# Patient Record
Sex: MALE | Race: WHITE | NOT HISPANIC OR LATINO | Employment: OTHER | URBAN - METROPOLITAN AREA
[De-identification: names, ages, dates, MRNs, and addresses within clinical notes are randomized per-mention and may not be internally consistent; named-entity substitution may affect disease eponyms.]

---

## 2021-07-13 ENCOUNTER — HOSPITAL ENCOUNTER (EMERGENCY)
Facility: CLINIC | Age: 56
Discharge: HOME | End: 2021-07-13
Attending: EMERGENCY MEDICINE | Admitting: EMERGENCY MEDICINE
Payer: COMMERCIAL

## 2021-07-13 ENCOUNTER — HOSPITAL ENCOUNTER (EMERGENCY)
Dept: CT IMAGING | Facility: CLINIC | Age: 56
End: 2021-07-13
Attending: EMERGENCY MEDICINE
Payer: COMMERCIAL

## 2021-07-13 VITALS
TEMPERATURE: 98 F | OXYGEN SATURATION: 95 % | HEART RATE: 66 BPM | SYSTOLIC BLOOD PRESSURE: 124 MMHG | RESPIRATION RATE: 16 BRPM | DIASTOLIC BLOOD PRESSURE: 86 MMHG | WEIGHT: 189 LBS

## 2021-07-13 DIAGNOSIS — S09.90XA INJURY OF HEAD, INITIAL ENCOUNTER: ICD-10-CM

## 2021-07-13 DIAGNOSIS — S06.0X0A CONCUSSION WITHOUT LOSS OF CONSCIOUSNESS, INITIAL ENCOUNTER: ICD-10-CM

## 2021-07-13 DIAGNOSIS — S16.1XXA STRAIN OF NECK MUSCLE, INITIAL ENCOUNTER: ICD-10-CM

## 2021-07-13 PROCEDURE — 99284 EMERGENCY DEPT VISIT MOD MDM: CPT | Mod: 25

## 2021-07-13 PROCEDURE — 250N000013 HC RX MED GY IP 250 OP 250 PS 637: Performed by: EMERGENCY MEDICINE

## 2021-07-13 PROCEDURE — 70450 CT HEAD/BRAIN W/O DYE: CPT

## 2021-07-13 RX ORDER — IBUPROFEN 600 MG/1
600 TABLET, FILM COATED ORAL ONCE
Status: COMPLETED | OUTPATIENT
Start: 2021-07-13 | End: 2021-07-13

## 2021-07-13 RX ADMIN — IBUPROFEN 600 MG: 600 TABLET, FILM COATED ORAL at 15:51

## 2021-07-13 NOTE — DISCHARGE INSTRUCTIONS
The head CT scan appears reassuring.  Your symptoms appear consistent with a mild concussion and neck strain.  Due to use over-the-counter ibuprofen every 6-8 hours as needed for any further pain.  You can also apply ice to the affected areas every 3-4 hours for the next few days as well.  It is recommended to avoid any strenuous physical or mental activity over the next few days until your concussion symptoms resolve.  Follow-up with your primary care physician for reevaluation as needed or return back to ED sooner for any worsening headaches, dizziness, vomiting, confusion, or any other new or concerning symptoms.

## 2021-07-13 NOTE — ED PROVIDER NOTES
EMERGENCY DEPARTMENT ENCOUNTER      NAME: Jaskaran Nance  AGE: 55 year old male  YOB: 1965  MRN: [id number]  EVALUATION DATE & TIME: 2021  2:45 PM    PCP: No primary care provider on file.    ED PROVIDER: Shruthi Sky D.O.      CHIEF COMPLAINT:  Chief Complaint   Patient presents with     Fall         FINAL IMPRESSION:  1. Injury of head, initial encounter    2. Concussion without loss of consciousness, initial encounter    3. Strain of neck muscle, initial encounter          ED COURSE & MEDICAL DECISION MAKIN year old male presented to the ED for evaluation following a head injury.  The patient states that he fell down a couple of stairs yesterday while attempting to carry furniture up the stairs.  He states that the piece of furniture hit him in the head but he denied any loss of consciousness.  The patient complains of a mild right-sided headache and right lateral neck pain.  He also states that he had difficulty sleeping last evening.  He denied any significant dizziness, nausea, or vomiting.  Patient had no other significant complaints of trauma or injury.  Upon arrival to the ED the patient was noted to be hemodynamically stable.  He did not appear to be in any obvious distress or discomfort at the time of his initial valuation.  The patient's physical exam was unremarkable.  There were no signs of significant trauma or injury noted on exam.      Following his initial evaluation the patient was given ibuprofen for the headache.  The patient was informed that his symptoms are likely due to a mild concussion and lateral neck strain.  The patient requested the head CT scan for further evaluation.  Head CT did not show any evidence of fracture, intracranial bleeding, or other signs of significant traumatic injury.    The patient was reevaluated and informed of the reassuring head CT scan results.  The patient stated that he was feeling better after receiving the ibuprofen.  The  patient was again educated about concussions and reassured.  The patient was instructed to use over-the-counter Profen at home as needed for any further pain in addition to applying ice to the affected areas.  The patient was instructed to avoid any strenuous physical or mental activity until his concussion symptoms reside which will likely occur over the next few days.  The patient was instructed to follow-up with his primary care physician or to return to the ED sooner for any new or concerning symptoms.    At the conclusion of the encounter I discussed the results of all of the tests and the disposition. The questions were answered. The patient or family acknowledged understanding and was agreeable with the care plan.     3:21 PM Met with patient for initial interview and exam. Discussed initial plan for care for their stay in the emergency department.     MEDICATIONS GIVEN IN THE EMERGENCY:  Medications   ibuprofen (ADVIL/MOTRIN) tablet 600 mg (600 mg Oral Given 7/13/21 1551)       NEW PRESCRIPTIONS STARTED AT TODAY'S ER VISIT:  New Prescriptions    No medications on file          =================================================================    HPI  Jaskaran Nance is a 55 year old male who presents to the emergency department for evaluation after a fall.    Yesterday (7/12) in the evening, patient was moving a cedar chest up the stairs with his brother when his brother lost his footing. The patient was on the lower step in the way of the chest, and as the chest began to fall, he tried to move out of the way, but the chest hit him on the right side of the head. His head was sore at first, but later developed a headache around the periphery of his head, especially on the right side. Patient also notes his neck is a bit sore. He took Tylenol last night and Advil this morning around 0900 which relieved the pain. Patient had trouble sleeping last night. He denies taking blood thinners, and notes that he  has not been taking his baby aspirin the last few days. Patient denies nausea, vision changes, chest pain, abdominal pain, pain in his face, or any other current complaints.    I, Francesca Serna am serving as a scribe to document services personally performed by Dr. Shruthi Sky DO, based on my observation and the provider's statements to me. I, Dr. Shruthi Sky DO attest that Francesca Serna is acting in a scribe capacity, has observed my performance of the services and has documented them in accordance with my direction.    REVIEW OF SYSTEMS   Constitutional: Denies fever, chills, unintentional weight loss or fatigue   Eyes: Denies visual changes or discharge    HENT: Denies sore throat, ear pain. Positive for neck pain.  Respiratory: Denies cough or shortness of breath    Cardiovascular: Denies chest pain, palpitations or leg swelling  GI: Denies abdominal pain, nausea, vomiting, or dark, bloody stools.    : Denies hematuria, dysuria, or flank pain  Musculoskeletal: Denies any new back pain, muscle, or joint pains.   Skin: Denies rash or wound  Neurologic: Denies, new weakness, focal weakness, or sensory changes. Positive for headache.  Lymphatic: Denies swollen glands    Psychiatric: Denies depression, suicidal ideation or homicidal ideation.    Remainder of systems reviewed, unless noted in HPI all others negative.      PAST MEDICAL HISTORY:  No past medical history on file.    PAST SURGICAL HISTORY:  No past surgical history on file.        CURRENT MEDICATIONS:    [unfilled]    ALLERGIES:  No Known Allergies    FAMILY HISTORY:  No family history on file.    SOCIAL HISTORY:   Social History     Socioeconomic History     Marital status: Not on file     Spouse name: Not on file     Number of children: Not on file     Years of education: Not on file     Highest education level: Not on file   Occupational History     Not on file   Tobacco Use     Smoking status: Not on file   Substance and Sexual Activity      Alcohol use: Not on file     Drug use: Not on file     Sexual activity: Not on file   Other Topics Concern     Not on file   Social History Narrative     Not on file     Social Determinants of Health     Financial Resource Strain:      Difficulty of Paying Living Expenses:    Food Insecurity:      Worried About Running Out of Food in the Last Year:      Ran Out of Food in the Last Year:    Transportation Needs:      Lack of Transportation (Medical):      Lack of Transportation (Non-Medical):    Physical Activity:      Days of Exercise per Week:      Minutes of Exercise per Session:    Stress:      Feeling of Stress :    Social Connections:      Frequency of Communication with Friends and Family:      Frequency of Social Gatherings with Friends and Family:      Attends Mandaen Services:      Active Member of Clubs or Organizations:      Attends Club or Organization Meetings:      Marital Status:    Intimate Partner Violence:      Fear of Current or Ex-Partner:      Emotionally Abused:      Physically Abused:      Sexually Abused:        PHYSICAL EXAM    /83   Pulse 74   Temp 98  F (36.7  C) (Temporal)   Resp 16   Wt 85.7 kg (189 lb)   SpO2 97%   General Presentation: No apparent distress.  ENT: Ears atraumatic. Nose atraumatic/non-tender. No septal hematoma. Face/mandible non-tender. Oropharynx clear.  Eye: Pupils equal round and reactive to light. EOMFI. No conjunctival hemorrhage. No hyphema. Eye lids normal.  Pulmonary: Spontaneous respiration. No respiratory distress. Clear equal breath sounds. Airway patent. Speech is normal. No stridor. Grossly stable chest wall. No crepitus. No chest wall tenderness to palpation noted.  Circulatory: Regular rate and rhythm. No murmurs, rubs, or gallops. Normal capillary refill.   Abdominal: Abdomen soft, non-tender and non-distended. No peritoneal signs. No flank tenderness. Normal bowel sounds. Pelvis stable/non-tender.   Neurologic: Alert and awake. Cranial  nerves II-XII grossly intact.  No gross motor deficit. No gross sensory deficit. Normal upper extremity and lower extremity strength. Kaia Coma Score 15.  Spine: No bony tenderness to palpation in the cervical spine, thoracic spine, lumbar spine, or sacrum.     Upper extremities:  Full range of motion. No tenderness to palpation.  Pulses 2/4 bilateral upper extremities . No wounds, abrasions, lacerations, or contusions noted.   Lower extremities:  Full range of motion. No tenderness to palpation.  Pulses 2/4 bilateral lower extremities . No wounds, abrasions, lacerations, or contusions noted.   Skin: Head/scalp non tender. No wounds, abrasions, lacerations, or contusions of head/scalp, chest, back, abdomen, flank or pelvis.          LAB:  All pertinent labs reviewed and interpreted.  Labs Ordered and Resulted from Time of ED Arrival Up to the Time of Departure from the ED - No data to display    RADIOLOGY:  Reviewed all pertinent imaging. Please see official radiology reports  Head CT w/o contrast   Final Result   IMPRESSION:     1.  No evidence of acute intracranial hemorrhage or mass effect.          I, Francesca Serna, am serving as a scribe to document services personally performed by Dr. Shruthi Sky based on my observation and the provider's statements to me. IShruthi, DO attest that Francesca Serna is acting in a scribe capacity, has observed my performance of the services and has documented them in accordance with my direction.    Shruthi Sky D.O.  Emergency Medicine  [facility] EMERGENCY ROOM  [address] [facility] [id number] [phone]  Dept: [phone]       Shruthi Sky DO  07/13/21 4821

## 2021-07-13 NOTE — ED TRIAGE NOTES
"Patient presents to the ED after having a cedar chest hit him on the side of the head when carrying up the stairs. He fell down three steps. Denies LOC but was \"dazed for a little bit\". Denies dizziness and confusion. Does not take blood thinner.  "

## 2021-08-22 ENCOUNTER — HEALTH MAINTENANCE LETTER (OUTPATIENT)
Age: 56
End: 2021-08-22

## 2021-10-17 ENCOUNTER — HEALTH MAINTENANCE LETTER (OUTPATIENT)
Age: 56
End: 2021-10-17

## 2022-10-03 ENCOUNTER — HEALTH MAINTENANCE LETTER (OUTPATIENT)
Age: 57
End: 2022-10-03

## 2023-10-21 ENCOUNTER — HEALTH MAINTENANCE LETTER (OUTPATIENT)
Age: 58
End: 2023-10-21